# Patient Record
Sex: MALE | Race: WHITE | NOT HISPANIC OR LATINO | Employment: UNEMPLOYED | URBAN - METROPOLITAN AREA
[De-identification: names, ages, dates, MRNs, and addresses within clinical notes are randomized per-mention and may not be internally consistent; named-entity substitution may affect disease eponyms.]

---

## 2018-03-05 ENCOUNTER — HOSPITAL ENCOUNTER (EMERGENCY)
Facility: HOSPITAL | Age: 38
Discharge: HOME | End: 2018-03-06
Attending: EMERGENCY MEDICINE | Admitting: EMERGENCY MEDICINE
Payer: SUBSIDIZED

## 2018-03-05 ENCOUNTER — APPOINTMENT (EMERGENCY)
Dept: RADIOLOGY | Facility: HOSPITAL | Age: 38
End: 2018-03-05
Payer: SUBSIDIZED

## 2018-03-05 VITALS
WEIGHT: 290 LBS | DIASTOLIC BLOOD PRESSURE: 87 MMHG | SYSTOLIC BLOOD PRESSURE: 187 MMHG | TEMPERATURE: 98 F | OXYGEN SATURATION: 97 % | HEART RATE: 91 BPM | RESPIRATION RATE: 24 BRPM

## 2018-03-05 DIAGNOSIS — N20.0 KIDNEY STONE: Primary | ICD-10-CM

## 2018-03-05 LAB
ALBUMIN SERPL BCP-MCNC: 3.9 G/DL (ref 3.5–5)
ALP SERPL-CCNC: 80 U/L (ref 46–116)
ALT SERPL W P-5'-P-CCNC: 44 U/L (ref 12–78)
ANION GAP SERPL CALCULATED.3IONS-SCNC: 10 MMOL/L (ref 4–13)
AST SERPL W P-5'-P-CCNC: 21 U/L (ref 5–45)
BASOPHILS # BLD AUTO: 0 THOUSANDS/ΜL (ref 0–0.1)
BASOPHILS NFR BLD AUTO: 0 % (ref 0–1)
BILIRUB SERPL-MCNC: 0.2 MG/DL (ref 0.2–1)
BUN SERPL-MCNC: 17 MG/DL (ref 5–25)
CALCIUM SERPL-MCNC: 9.4 MG/DL (ref 8.3–10.1)
CHLORIDE SERPL-SCNC: 104 MMOL/L (ref 100–108)
CO2 SERPL-SCNC: 30 MMOL/L (ref 21–32)
CREAT SERPL-MCNC: 0.96 MG/DL (ref 0.6–1.3)
EOSINOPHIL # BLD AUTO: 0.1 THOUSAND/ΜL (ref 0–0.61)
EOSINOPHIL NFR BLD AUTO: 1 % (ref 0–6)
ERYTHROCYTE [DISTWIDTH] IN BLOOD BY AUTOMATED COUNT: 14.6 % (ref 11.6–15.1)
GFR SERPL CREATININE-BSD FRML MDRD: 100 ML/MIN/1.73SQ M
GLUCOSE SERPL-MCNC: 145 MG/DL (ref 65–140)
HCT VFR BLD AUTO: 46.8 % (ref 42–52)
HGB BLD-MCNC: 15.7 G/DL (ref 14–18)
LIPASE SERPL-CCNC: 89 U/L (ref 73–393)
LYMPHOCYTES # BLD AUTO: 1.7 THOUSANDS/ΜL (ref 0.6–4.47)
LYMPHOCYTES NFR BLD AUTO: 14 % (ref 14–44)
MCH RBC QN AUTO: 29.4 PG (ref 27–31)
MCHC RBC AUTO-ENTMCNC: 33.5 G/DL (ref 31.4–37.4)
MCV RBC AUTO: 88 FL (ref 82–98)
MONOCYTES # BLD AUTO: 0.2 THOUSAND/ΜL (ref 0.17–1.22)
MONOCYTES NFR BLD AUTO: 2 % (ref 4–12)
NEUTROPHILS # BLD AUTO: 10 THOUSANDS/ΜL (ref 1.85–7.62)
NEUTS SEG NFR BLD AUTO: 84 % (ref 43–75)
NRBC BLD AUTO-RTO: 0 /100 WBCS
PLATELET # BLD AUTO: 282 THOUSANDS/UL (ref 130–400)
PLATELET BLD QL SMEAR: ADEQUATE
PMV BLD AUTO: 7.7 FL (ref 8.9–12.7)
POTASSIUM SERPL-SCNC: 3.8 MMOL/L (ref 3.5–5.3)
PROT SERPL-MCNC: 7.9 G/DL (ref 6.4–8.2)
RBC # BLD AUTO: 5.33 MILLION/UL (ref 4.7–6.1)
SODIUM SERPL-SCNC: 144 MMOL/L (ref 136–145)
WBC # BLD AUTO: 12 THOUSAND/UL (ref 4.8–10.8)

## 2018-03-05 PROCEDURE — 85025 COMPLETE CBC W/AUTO DIFF WBC: CPT | Performed by: EMERGENCY MEDICINE

## 2018-03-05 PROCEDURE — 96361 HYDRATE IV INFUSION ADD-ON: CPT

## 2018-03-05 PROCEDURE — 36415 COLL VENOUS BLD VENIPUNCTURE: CPT | Performed by: EMERGENCY MEDICINE

## 2018-03-05 PROCEDURE — 96375 TX/PRO/DX INJ NEW DRUG ADDON: CPT

## 2018-03-05 PROCEDURE — 80053 COMPREHEN METABOLIC PANEL: CPT | Performed by: EMERGENCY MEDICINE

## 2018-03-05 PROCEDURE — 74177 CT ABD & PELVIS W/CONTRAST: CPT

## 2018-03-05 PROCEDURE — 83690 ASSAY OF LIPASE: CPT | Performed by: EMERGENCY MEDICINE

## 2018-03-05 PROCEDURE — 96374 THER/PROPH/DIAG INJ IV PUSH: CPT

## 2018-03-05 RX ORDER — KETOROLAC TROMETHAMINE 30 MG/ML
15 INJECTION, SOLUTION INTRAMUSCULAR; INTRAVENOUS ONCE
Status: COMPLETED | OUTPATIENT
Start: 2018-03-05 | End: 2018-03-05

## 2018-03-05 RX ORDER — ONDANSETRON 2 MG/ML
4 INJECTION INTRAMUSCULAR; INTRAVENOUS ONCE
Status: COMPLETED | OUTPATIENT
Start: 2018-03-05 | End: 2018-03-05

## 2018-03-05 RX ADMIN — SODIUM CHLORIDE 1000 ML: 0.9 INJECTION, SOLUTION INTRAVENOUS at 22:39

## 2018-03-05 RX ADMIN — IOHEXOL 100 ML: 350 INJECTION, SOLUTION INTRAVENOUS at 23:37

## 2018-03-05 RX ADMIN — ONDANSETRON 4 MG: 2 INJECTION INTRAMUSCULAR; INTRAVENOUS at 22:45

## 2018-03-05 RX ADMIN — KETOROLAC TROMETHAMINE 15 MG: 30 INJECTION, SOLUTION INTRAMUSCULAR at 22:45

## 2018-03-06 LAB
BACTERIA UR QL AUTO: ABNORMAL /HPF
BILIRUB UR QL STRIP: NEGATIVE
CLARITY UR: CLEAR
COLOR UR: YELLOW
GLUCOSE UR STRIP-MCNC: NEGATIVE MG/DL
HGB UR QL STRIP.AUTO: ABNORMAL
KETONES UR STRIP-MCNC: NEGATIVE MG/DL
LEUKOCYTE ESTERASE UR QL STRIP: NEGATIVE
NITRITE UR QL STRIP: NEGATIVE
NON-SQ EPI CELLS URNS QL MICRO: ABNORMAL /HPF
PH UR STRIP.AUTO: 5 [PH] (ref 5–9)
PROT UR STRIP-MCNC: NEGATIVE MG/DL
RBC #/AREA URNS AUTO: ABNORMAL /HPF
SP GR UR STRIP.AUTO: <=1.005 (ref 1–1.03)
UROBILINOGEN UR QL STRIP.AUTO: 0.2 E.U./DL
WBC #/AREA URNS AUTO: ABNORMAL /HPF

## 2018-03-06 PROCEDURE — 99284 EMERGENCY DEPT VISIT MOD MDM: CPT

## 2018-03-06 PROCEDURE — 81001 URINALYSIS AUTO W/SCOPE: CPT | Performed by: EMERGENCY MEDICINE

## 2018-03-06 RX ORDER — KETOROLAC TROMETHAMINE 10 MG/1
10 TABLET, FILM COATED ORAL EVERY 6 HOURS PRN
Qty: 20 TABLET | Refills: 0 | Status: SHIPPED | OUTPATIENT
Start: 2018-03-06 | End: 2018-04-07 | Stop reason: HOSPADM

## 2018-03-06 NOTE — ED PROVIDER NOTES
History  Chief Complaint   Patient presents with    Abdominal Pain     abd pain on L side and vomiting since this afternoon  states he took an antiparasitic pill given to him by his mother  states that is normal thing to do in  [place] he is from     Patient presents for sudden onset of LLQ pain with nausea and vomiting  States he took an antiparasite pill his mom sent him from [facility]'s  Patient has been in the [facility] for 20 years but its a normal thing to do regularly there  History provided by:  Patient   used: No    Abdominal Pain   Associated symptoms: nausea and vomiting    Associated symptoms: no chest pain, no diarrhea and no shortness of breath        None       History reviewed  No pertinent past medical history  History reviewed  No pertinent surgical history  History reviewed  No pertinent family history  I have reviewed and agree with the history as documented  Social History   Substance Use Topics    Smoking status: Current Every Day Smoker     Packs/day: 0 50    Smokeless tobacco: Never Used    Alcohol use No        Review of Systems   Respiratory: Negative for shortness of breath  Cardiovascular: Negative for chest pain  Gastrointestinal: Positive for abdominal pain, nausea and vomiting  Negative for blood in stool and diarrhea  All other systems reviewed and are negative  Physical Exam  ED Triage Vitals [03/05/18 2235]   Temperature Pulse Respirations Blood Pressure SpO2   98 °F (36 7 °C) 91 (!) 24 (!) 187/87 97 %      Temp Source Heart Rate Source Patient Position - Orthostatic VS BP Location FiO2 (%)   Tympanic Monitor Sitting Left arm --      Pain Score       8           Orthostatic Vital Signs  Vitals:    03/05/18 2235   BP: (!) 187/87   Pulse: 91   Patient Position - Orthostatic VS: Sitting       Physical Exam   Constitutional: He is oriented to person, place, and time  No distress     HENT:   Mouth/Throat: Oropharynx is clear and moist    Eyes: Pupils are equal, round, and reactive to light  Neck: Normal range of motion  Cardiovascular: Normal rate, regular rhythm and intact distal pulses  Pulmonary/Chest: Effort normal and breath sounds normal  No respiratory distress  Abdominal: Soft  Bowel sounds are normal  He exhibits no distension  There is tenderness  There is no rebound and no guarding  LLQ tenderness   Musculoskeletal: Normal range of motion  Neurological: He is alert and oriented to person, place, and time  Skin: Capillary refill takes less than 2 seconds  He is not diaphoretic  Nursing note and vitals reviewed        ED Medications  Medications   sodium chloride 0 9 % bolus 1,000 mL (0 mL Intravenous Stopped 3/6/18 0006)   ondansetron (ZOFRAN) injection 4 mg (4 mg Intravenous Given 3/5/18 2245)   ketorolac (TORADOL) injection 15 mg (15 mg Intravenous Given 3/5/18 2245)   iohexol (OMNIPAQUE) 350 MG/ML injection (MULTI-DOSE) 100 mL (100 mL Intravenous Given 3/5/18 2337)       Diagnostic Studies  Results Reviewed     Procedure Component Value Units Date/Time    Urine Microscopic [[id number]]  (Abnormal) Collected:  03/06/18 0007    Lab Status:  Final result Specimen:  Urine from Urine, Clean Catch Updated:  03/06/18 0022     RBC, UA 10-20 (A) /hpf      WBC, UA None Seen /hpf      Epithelial Cells None Seen /hpf      Bacteria, UA Occasional /hpf     UA w Reflex to Microscopic [[id number]]  (Abnormal) Collected:  03/06/18 0007    Lab Status:  Final result Specimen:  Urine from Urine, Clean Catch Updated:  03/06/18 0013     Color, UA Yellow     Clarity, UA Clear     Specific Gravity, UA <=1 005     pH, UA 5 0     Leukocytes, UA Negative     Nitrite, UA Negative     Protein, UA Negative mg/dl      Glucose, UA Negative mg/dl      Ketones, UA Negative mg/dl      Urobilinogen, UA 0 2 E U /dl      Bilirubin, UA Negative     Blood, UA Large (A)    Comprehensive metabolic panel [[id number]]  (Abnormal) Collected:  03/05/18 7557 Lab Status:  Final result Specimen:  Blood from Arm, Right Updated:  03/05/18 2307     Sodium 144 mmol/L      Potassium 3 8 mmol/L      Chloride 104 mmol/L      CO2 30 mmol/L      Anion Gap 10 mmol/L      BUN 17 mg/dL      Creatinine 0 96 mg/dL      Glucose 145 (H) mg/dL      Calcium 9 4 mg/dL      AST 21 U/L      ALT 44 U/L      Alkaline Phosphatase 80 U/L      Total Protein 7 9 g/dL      Albumin 3 9 g/dL      Total Bilirubin 0 20 mg/dL      eGFR 100 ml/min/1 73sq m     Narrative:         National Kidney Disease Education Program recommendations are as follows:  GFR calculation is accurate only with a steady state creatinine  Chronic Kidney disease less than 60 ml/min/1 73 sq  meters  Kidney failure less than 15 ml/min/1 73 sq  meters  Lipase [[id number]]  (Normal) Collected:  03/05/18 2239    Lab Status:  Final result Specimen:  Blood from Arm, Right Updated:  03/05/18 2307     Lipase 89 u/L     CBC and differential [[id number]]  (Abnormal) Collected:  03/05/18 2239    Lab Status:  Final result Specimen:  Blood from Arm, Right Updated:  03/05/18 2306     WBC 12 00 (H) Thousand/uL      RBC 5 33 Million/uL      Hemoglobin 15 7 g/dL      Hematocrit 46 8 %      MCV 88 fL      MCH 29 4 pg      MCHC 33 5 g/dL      RDW 14 6 %      MPV 7 7 (L) fL      Platelets 046 Thousands/uL      nRBC 0 /100 WBCs      Neutrophils Relative 84 (H) %      Lymphocytes Relative 14 %      Monocytes Relative 2 (L) %      Eosinophils Relative 1 %      Basophils Relative 0 %      Neutrophils Absolute 10 00 (H) Thousands/µL      Lymphocytes Absolute 1 70 Thousands/µL      Monocytes Absolute 0 20 Thousand/µL      Eosinophils Absolute 0 10 Thousand/µL      Basophils Absolute 0 00 Thousands/µL                  CT abdomen pelvis with contrast   Final Result by Amparo Hoang MD (03/05 2357)      Mild hydronephrosis and hydroureter  Obstructing 5 mm calculus in the distal left ureter at the level of the acetabular roof              Workstation performed: [id number]                    Procedures  Procedures       Phone Contacts  ED Phone Contact    ED Course  ED Course                                MDM  Number of Diagnoses or Management Options  Kidney stone:   Diagnosis management comments: Pulse ox 97% on RA indicating adequate oxygenation    Patient feeling better on re-exam  Informed of kidney stone and still possibility of him passing on his own  Discussed pain management but he did not want anything stronger than what he got which was Toradol  Advised follow up with Urology  Amount and/or Complexity of Data Reviewed  Clinical lab tests: ordered and reviewed  Tests in the radiology section of CPT®: ordered and reviewed    Patient Progress  Patient progress: stable    CritCare Time    Disposition  Final diagnoses:   Kidney stone     Time reflects when diagnosis was documented in both MDM as applicable and the Disposition within this note     Time User Action Codes Description Comment    3/6/2018 12:21 AM Alexa Gain Add [N20 0] Kidney stone       ED Disposition     ED Disposition Condition Comment    Discharge  [place] discharge to home/self care  Condition at discharge: stable        Follow-up Information     Follow up With Specialties Details Why Dereje Enriquez MD Urology In 3 days  [facility] [phone]          Patient's Medications   Discharge Prescriptions    KETOROLAC (TORADOL) 10 MG TABLET    Take 1 tablet (10 mg total) by mouth every 6 (six) hours as needed for moderate pain for up to 5 days       Start Date: 3/6/2018  End Date: 3/11/2018       Order Dose: 10 mg       Quantity: 20 tablet    Refills: 0     No discharge procedures on file      ED Provider  Electronically Signed by           Bradley Stafford DO  03/06/18 0028

## 2018-03-06 NOTE — DISCHARGE INSTRUCTIONS
How to Strain Your Urine   WHAT YOU NEED TO KNOW:   Urinate into a strainer (funnel with a fine mesh on the bottom) or glass jar to collect kidney stones  DISCHARGE INSTRUCTIONS:   Medicines:   · Pain medicine: You may be given medicine to take away or decrease pain  Do not wait until the pain is severe before you take your medicine  · NSAIDs:  These medicines decrease swelling, pain, and fever  NSAIDs are available without a doctor's order  Ask which medicine is right for you  Ask how much to take and when to take it  Take as directed  NSAIDs can cause stomach bleeding and kidney problems if not taken correctly  · Nausea medicine: This medicine calms your stomach and prevents or controls vomiting  · Take your medicine as directed  Contact your healthcare provider if you think your medicine is not helping or if you have side effects  Tell him of her if you are allergic to any medicine  Keep a list of the medicines, vitamins, and herbs you take  Include the amounts, and when and why you take them  Bring the list or the pill bottles to follow-up visits  Carry your medicine list with you in case of an emergency  Drink liquids as directed:  Drink about 3 liters of liquids each day, or as directed  That equals about 12 glasses of water or fruit juice  Half of your total daily liquids should be water  Limit coffee, tea, and soda to 2 cups daily  Your urine will be pale and clear if you are drinking enough liquid  Self-care:   · Activity:  Exercise, such as walking, may help decrease your pain  · Avoid heat:  Heat may cause you to sweat, urinate less, and become dehydrated  Follow up with your healthcare provider or urologist as directed:  Write down your questions so you remember to ask them during your visits  Contact your healthcare provider or urologist if:   · You have a fever and chills  · Your urine looks cloudy or has a bad smell  · You have burning pain when you urinate       · You have trouble urinating  · You are vomiting and it does not get better, even after you take medicine  · You have questions or concerns about your condition or care  Return to the emergency department if:   · You are not able to urinate  · You have severe pain in your lower abdomen or side  · Your heart flutters or beats faster than usual   © 2017 [address] [employer] Information is for End User's use only and may not be sold, redistributed or otherwise used for commercial purposes  All illustrations and images included in CareNotes® are the copyrighted property of [employer]  or [employer]  The above information is an  only  It is not intended as medical advice for individual conditions or treatments  Talk to your doctor, nurse or pharmacist before following any medical regimen to see if it is safe and effective for you  Kidney Stones   WHAT YOU NEED TO KNOW:   Kidney stones form in the urinary system when the water and waste in your urine are out of balance  When this happens, certain types of waste crystals separate from the urine  The crystals build up and form kidney stones  You may have 1 or more kidney stones  DISCHARGE INSTRUCTIONS:   Seek care immediately:   · You have vomiting that is not relieved by medicine  Contact your healthcare provider if:   · You have a fever  · You have trouble passing urine  · You see blood in your urine  · You have severe pain  · You have any questions or concerns about your condition or care  Medicines:   · NSAIDs , such as ibuprofen, help decrease swelling, pain, and fever  This medicine is available with or without a doctor's order  NSAIDs can cause stomach bleeding or kidney problems in certain people  If you take blood thinner medicine, always ask your healthcare provider if NSAIDs are safe for you  Always read the medicine label and follow directions  · Prescription medicine  may be given   Ask how to take this medicine safely  · Medicines  to balance your electrolytes may be needed  · Take your medicine as directed  Contact your healthcare provider if you think your medicine is not helping or if you have side effects  Tell him or her if you are allergic to any medicine  Keep a list of the medicines, vitamins, and herbs you take  Include the amounts, and when and why you take them  Bring the list or the pill bottles to follow-up visits  Carry your medicine list with you in case of an emergency  Follow up with your healthcare provider as directed: You may need to return for more tests  Write down your questions so you remember to ask them during your visits  Self-care:   · Drink plenty of liquids  Your healthcare provider may tell you to drink at least 8 to 12 (eight-ounce) cups of liquids each day  This helps flush out the kidney stones when you urinate  Water is the best liquid to drink  · Strain your urine every time you go to the bathroom  Urinate through a strainer or a piece of thin cloth to catch the stones  Take the stones to your healthcare provider so they can be sent to the lab for tests  This will help your healthcare providers plan the best treatment for you  · Eat a variety of healthy foods  Healthy foods include fruits, vegetables, whole-grain breads, low-fat dairy products, beans, and fish  You may need to limit how much sodium (salt) or protein you eat  Ask for information about the best foods for you  · Stay active  Your stones may pass more easily by if you stay active  Ask about the best activities for you  After you pass your kidney stones:  Once you have passed your kidney stones, your healthcare provider may  order a 24-hour urine test  Results from a 24-hour urine test will help your healthcare provider plan ways to prevent more stones from forming  If you are told to do a 24-hour test, your healthcare provider will give you more instructions     © 2017 [facility] Information is for End User's use only and may not be sold, redistributed or otherwise used for commercial purposes  All illustrations and images included in CareNotes® are the copyrighted property of A [employer] M [employer] Inc  or [employer]  The above information is an  only  It is not intended as medical advice for individual conditions or treatments  Talk to your doctor, nurse or pharmacist before following any medical regimen to see if it is safe and effective for you

## 2018-04-06 ENCOUNTER — HOSPITAL ENCOUNTER (INPATIENT)
Facility: HOSPITAL | Age: 38
LOS: 1 days | Discharge: HOME | DRG: 311 | End: 2018-04-07
Attending: EMERGENCY MEDICINE | Admitting: INTERNAL MEDICINE
Payer: SUBSIDIZED

## 2018-04-06 ENCOUNTER — APPOINTMENT (EMERGENCY)
Dept: RADIOLOGY | Facility: HOSPITAL | Age: 38
DRG: 311 | End: 2018-04-06
Payer: SUBSIDIZED

## 2018-04-06 DIAGNOSIS — N20.1 LEFT URETERAL STONE: ICD-10-CM

## 2018-04-06 DIAGNOSIS — N20.0 KIDNEY STONE: Primary | ICD-10-CM

## 2018-04-06 DIAGNOSIS — N20.1 LEFT URETERAL CALCULUS: ICD-10-CM

## 2018-04-06 LAB
ANION GAP SERPL CALCULATED.3IONS-SCNC: 10 MMOL/L (ref 4–13)
BASOPHILS # BLD AUTO: 0 THOUSANDS/ΜL (ref 0–0.1)
BASOPHILS NFR BLD AUTO: 0 % (ref 0–1)
BUN SERPL-MCNC: 18 MG/DL (ref 5–25)
CALCIUM SERPL-MCNC: 9.1 MG/DL (ref 8.3–10.1)
CHLORIDE SERPL-SCNC: 104 MMOL/L (ref 100–108)
CO2 SERPL-SCNC: 26 MMOL/L (ref 21–32)
CREAT SERPL-MCNC: 1.31 MG/DL (ref 0.6–1.3)
EOSINOPHIL # BLD AUTO: 0.2 THOUSAND/ΜL (ref 0–0.61)
EOSINOPHIL NFR BLD AUTO: 2 % (ref 0–6)
ERYTHROCYTE [DISTWIDTH] IN BLOOD BY AUTOMATED COUNT: 13.6 % (ref 11.6–15.1)
GFR SERPL CREATININE-BSD FRML MDRD: 69 ML/MIN/1.73SQ M
GLUCOSE SERPL-MCNC: 113 MG/DL (ref 65–140)
HCT VFR BLD AUTO: 42.9 % (ref 42–52)
HGB BLD-MCNC: 14.4 G/DL (ref 14–18)
LYMPHOCYTES # BLD AUTO: 1.9 THOUSANDS/ΜL (ref 0.6–4.47)
LYMPHOCYTES NFR BLD AUTO: 21 % (ref 14–44)
MCH RBC QN AUTO: 29.5 PG (ref 27–31)
MCHC RBC AUTO-ENTMCNC: 33.5 G/DL (ref 31.4–37.4)
MCV RBC AUTO: 88 FL (ref 82–98)
MONOCYTES # BLD AUTO: 0.6 THOUSAND/ΜL (ref 0.17–1.22)
MONOCYTES NFR BLD AUTO: 6 % (ref 4–12)
NEUTROPHILS # BLD AUTO: 6.6 THOUSANDS/ΜL (ref 1.85–7.62)
NEUTS SEG NFR BLD AUTO: 71 % (ref 43–75)
NRBC BLD AUTO-RTO: 0 /100 WBCS
PLATELET # BLD AUTO: 264 THOUSANDS/UL (ref 130–400)
PMV BLD AUTO: 7.4 FL (ref 8.9–12.7)
POTASSIUM SERPL-SCNC: 3.8 MMOL/L (ref 3.5–5.3)
RBC # BLD AUTO: 4.88 MILLION/UL (ref 4.7–6.1)
SODIUM SERPL-SCNC: 140 MMOL/L (ref 136–145)
WBC # BLD AUTO: 9.3 THOUSAND/UL (ref 4.8–10.8)

## 2018-04-06 PROCEDURE — 85025 COMPLETE CBC W/AUTO DIFF WBC: CPT | Performed by: EMERGENCY MEDICINE

## 2018-04-06 PROCEDURE — 80048 BASIC METABOLIC PNL TOTAL CA: CPT | Performed by: EMERGENCY MEDICINE

## 2018-04-06 PROCEDURE — 74176 CT ABD & PELVIS W/O CONTRAST: CPT

## 2018-04-06 PROCEDURE — 96374 THER/PROPH/DIAG INJ IV PUSH: CPT

## 2018-04-06 PROCEDURE — 96375 TX/PRO/DX INJ NEW DRUG ADDON: CPT

## 2018-04-06 PROCEDURE — 36415 COLL VENOUS BLD VENIPUNCTURE: CPT | Performed by: EMERGENCY MEDICINE

## 2018-04-06 PROCEDURE — 96361 HYDRATE IV INFUSION ADD-ON: CPT

## 2018-04-06 RX ORDER — SODIUM CHLORIDE 9 MG/ML
125 INJECTION, SOLUTION INTRAVENOUS CONTINUOUS
Status: DISCONTINUED | OUTPATIENT
Start: 2018-04-06 | End: 2018-04-07

## 2018-04-06 RX ORDER — TAMSULOSIN HYDROCHLORIDE 0.4 MG/1
0.4 CAPSULE ORAL ONCE
Status: COMPLETED | OUTPATIENT
Start: 2018-04-06 | End: 2018-04-06

## 2018-04-06 RX ORDER — KETOROLAC TROMETHAMINE 30 MG/ML
15 INJECTION, SOLUTION INTRAMUSCULAR; INTRAVENOUS ONCE
Status: COMPLETED | OUTPATIENT
Start: 2018-04-06 | End: 2018-04-06

## 2018-04-06 RX ORDER — MORPHINE SULFATE 4 MG/ML
4 INJECTION, SOLUTION INTRAMUSCULAR; INTRAVENOUS ONCE
Status: COMPLETED | OUTPATIENT
Start: 2018-04-06 | End: 2018-04-06

## 2018-04-06 RX ORDER — ONDANSETRON 2 MG/ML
4 INJECTION INTRAMUSCULAR; INTRAVENOUS ONCE
Status: COMPLETED | OUTPATIENT
Start: 2018-04-06 | End: 2018-04-06

## 2018-04-06 RX ADMIN — MORPHINE SULFATE 4 MG: 4 INJECTION INTRAVENOUS at 22:20

## 2018-04-06 RX ADMIN — ONDANSETRON 4 MG: 2 INJECTION INTRAMUSCULAR; INTRAVENOUS at 22:20

## 2018-04-06 RX ADMIN — TAMSULOSIN HYDROCHLORIDE 0.4 MG: 0.4 CAPSULE ORAL at 23:49

## 2018-04-06 RX ADMIN — KETOROLAC TROMETHAMINE 15 MG: 30 INJECTION, SOLUTION INTRAMUSCULAR at 22:22

## 2018-04-06 RX ADMIN — SODIUM CHLORIDE 1000 ML: 0.9 INJECTION, SOLUTION INTRAVENOUS at 22:18

## 2018-04-06 RX ADMIN — SODIUM CHLORIDE 125 ML/HR: 0.9 INJECTION, SOLUTION INTRAVENOUS at 23:50

## 2018-04-07 ENCOUNTER — APPOINTMENT (INPATIENT)
Dept: RADIOLOGY | Facility: HOSPITAL | Age: 38
DRG: 311 | End: 2018-04-07
Payer: SUBSIDIZED

## 2018-04-07 ENCOUNTER — ANESTHESIA (INPATIENT)
Dept: PERIOP | Facility: HOSPITAL | Age: 38
DRG: 311 | End: 2018-04-07
Payer: SUBSIDIZED

## 2018-04-07 ENCOUNTER — ANESTHESIA EVENT (INPATIENT)
Dept: PERIOP | Facility: HOSPITAL | Age: 38
DRG: 311 | End: 2018-04-07
Payer: SUBSIDIZED

## 2018-04-07 VITALS
BODY MASS INDEX: 39.2 KG/M2 | DIASTOLIC BLOOD PRESSURE: 67 MMHG | HEIGHT: 71 IN | WEIGHT: 280 LBS | SYSTOLIC BLOOD PRESSURE: 111 MMHG | TEMPERATURE: 97 F | OXYGEN SATURATION: 98 % | HEART RATE: 69 BPM | RESPIRATION RATE: 20 BRPM

## 2018-04-07 PROBLEM — K59.00 CONSTIPATION: Status: ACTIVE | Noted: 2018-04-07

## 2018-04-07 PROBLEM — R10.9 FLANK PAIN: Status: ACTIVE | Noted: 2018-04-07

## 2018-04-07 PROBLEM — N20.1 LEFT URETERAL STONE: Status: ACTIVE | Noted: 2018-04-06

## 2018-04-07 PROBLEM — N20.0 KIDNEY STONE: Status: ACTIVE | Noted: 2018-04-07

## 2018-04-07 LAB
ANION GAP SERPL CALCULATED.3IONS-SCNC: 7 MMOL/L (ref 4–13)
BILIRUB UR QL STRIP: NEGATIVE
BUN SERPL-MCNC: 15 MG/DL (ref 5–25)
CALCIUM SERPL-MCNC: 8.5 MG/DL (ref 8.3–10.1)
CHLORIDE SERPL-SCNC: 106 MMOL/L (ref 100–108)
CLARITY UR: CLEAR
CO2 SERPL-SCNC: 25 MMOL/L (ref 21–32)
COLOR UR: YELLOW
CREAT SERPL-MCNC: 1.11 MG/DL (ref 0.6–1.3)
ERYTHROCYTE [DISTWIDTH] IN BLOOD BY AUTOMATED COUNT: 14.1 % (ref 11.6–15.1)
GFR SERPL CREATININE-BSD FRML MDRD: 84 ML/MIN/1.73SQ M
GLUCOSE SERPL-MCNC: 96 MG/DL (ref 65–140)
GLUCOSE UR STRIP-MCNC: NEGATIVE MG/DL
HCT VFR BLD AUTO: 40.9 % (ref 42–52)
HGB BLD-MCNC: 13.6 G/DL (ref 14–18)
HGB UR QL STRIP.AUTO: NEGATIVE
KETONES UR STRIP-MCNC: NEGATIVE MG/DL
LEUKOCYTE ESTERASE UR QL STRIP: NEGATIVE
MAGNESIUM SERPL-MCNC: 2.1 MG/DL (ref 1.6–2.6)
MCH RBC QN AUTO: 29.5 PG (ref 27–31)
MCHC RBC AUTO-ENTMCNC: 33.3 G/DL (ref 31.4–37.4)
MCV RBC AUTO: 89 FL (ref 82–98)
NITRITE UR QL STRIP: NEGATIVE
PH UR STRIP.AUTO: 6 [PH] (ref 5–9)
PHOSPHATE SERPL-MCNC: 3.6 MG/DL (ref 2.7–4.5)
PLATELET # BLD AUTO: 242 THOUSANDS/UL (ref 130–400)
PMV BLD AUTO: 7.7 FL (ref 8.9–12.7)
POTASSIUM SERPL-SCNC: 3.9 MMOL/L (ref 3.5–5.3)
PROT UR STRIP-MCNC: NEGATIVE MG/DL
RBC # BLD AUTO: 4.61 MILLION/UL (ref 4.7–6.1)
SODIUM SERPL-SCNC: 138 MMOL/L (ref 136–145)
SP GR UR STRIP.AUTO: 1.02 (ref 1–1.03)
TSH SERPL DL<=0.05 MIU/L-ACNC: 1.38 UIU/ML (ref 0.36–3.74)
UROBILINOGEN UR QL STRIP.AUTO: 0.2 E.U./DL
WBC # BLD AUTO: 6.5 THOUSAND/UL (ref 4.8–10.8)

## 2018-04-07 PROCEDURE — 84100 ASSAY OF PHOSPHORUS: CPT | Performed by: INTERNAL MEDICINE

## 2018-04-07 PROCEDURE — 99222 1ST HOSP IP/OBS MODERATE 55: CPT | Performed by: INTERNAL MEDICINE

## 2018-04-07 PROCEDURE — BT1FYZZ FLUOROSCOPY OF LEFT KIDNEY, URETER AND BLADDER USING OTHER CONTRAST: ICD-10-PCS | Performed by: UROLOGY

## 2018-04-07 PROCEDURE — C1769 GUIDE WIRE: HCPCS | Performed by: UROLOGY

## 2018-04-07 PROCEDURE — 99238 HOSP IP/OBS DSCHRG MGMT 30/<: CPT | Performed by: INTERNAL MEDICINE

## 2018-04-07 PROCEDURE — 0TC78ZZ EXTIRPATION OF MATTER FROM LEFT URETER, VIA NATURAL OR ARTIFICIAL OPENING ENDOSCOPIC: ICD-10-PCS | Performed by: UROLOGY

## 2018-04-07 PROCEDURE — 84443 ASSAY THYROID STIM HORMONE: CPT | Performed by: INTERNAL MEDICINE

## 2018-04-07 PROCEDURE — 85027 COMPLETE CBC AUTOMATED: CPT | Performed by: INTERNAL MEDICINE

## 2018-04-07 PROCEDURE — 80048 BASIC METABOLIC PNL TOTAL CA: CPT | Performed by: INTERNAL MEDICINE

## 2018-04-07 PROCEDURE — 74450 X-RAY URETHRA/BLADDER: CPT

## 2018-04-07 PROCEDURE — 88300 SURGICAL PATH GROSS: CPT | Performed by: PATHOLOGY

## 2018-04-07 PROCEDURE — 99285 EMERGENCY DEPT VISIT HI MDM: CPT

## 2018-04-07 PROCEDURE — 87081 CULTURE SCREEN ONLY: CPT | Performed by: INTERNAL MEDICINE

## 2018-04-07 PROCEDURE — 0T778DZ DILATION OF LEFT URETER WITH INTRALUMINAL DEVICE, VIA NATURAL OR ARTIFICIAL OPENING ENDOSCOPIC: ICD-10-PCS | Performed by: UROLOGY

## 2018-04-07 PROCEDURE — 83735 ASSAY OF MAGNESIUM: CPT | Performed by: INTERNAL MEDICINE

## 2018-04-07 PROCEDURE — 81003 URINALYSIS AUTO W/O SCOPE: CPT | Performed by: EMERGENCY MEDICINE

## 2018-04-07 PROCEDURE — C2617 STENT, NON-COR, TEM W/O DEL: HCPCS | Performed by: UROLOGY

## 2018-04-07 PROCEDURE — 82360 CALCULUS ASSAY QUANT: CPT | Performed by: UROLOGY

## 2018-04-07 DEVICE — URETERAL STENT 6 FR X 26 CM INLAY: Type: IMPLANTABLE DEVICE | Site: URETER | Status: FUNCTIONAL

## 2018-04-07 RX ORDER — OXYCODONE HYDROCHLORIDE AND ACETAMINOPHEN 5; 325 MG/1; MG/1
1 TABLET ORAL EVERY 8 HOURS PRN
Qty: 10 TABLET | Refills: 0 | Status: SHIPPED | OUTPATIENT
Start: 2018-04-07 | End: 2018-04-10

## 2018-04-07 RX ORDER — SODIUM CHLORIDE 9 MG/ML
150 INJECTION, SOLUTION INTRAVENOUS CONTINUOUS
Status: DISCONTINUED | OUTPATIENT
Start: 2018-04-07 | End: 2018-04-07 | Stop reason: HOSPADM

## 2018-04-07 RX ORDER — SENNOSIDES 8.6 MG
2 TABLET ORAL
Status: DISCONTINUED | OUTPATIENT
Start: 2018-04-07 | End: 2018-04-07 | Stop reason: HOSPADM

## 2018-04-07 RX ORDER — FENTANYL CITRATE/PF 50 MCG/ML
25 SYRINGE (ML) INJECTION
Status: DISCONTINUED | OUTPATIENT
Start: 2018-04-07 | End: 2018-04-07 | Stop reason: HOSPADM

## 2018-04-07 RX ORDER — KETOROLAC TROMETHAMINE 30 MG/ML
30 INJECTION, SOLUTION INTRAMUSCULAR; INTRAVENOUS EVERY 6 HOURS PRN
Status: DISCONTINUED | OUTPATIENT
Start: 2018-04-07 | End: 2018-04-07 | Stop reason: HOSPADM

## 2018-04-07 RX ORDER — FENTANYL CITRATE 50 UG/ML
INJECTION, SOLUTION INTRAMUSCULAR; INTRAVENOUS AS NEEDED
Status: DISCONTINUED | OUTPATIENT
Start: 2018-04-07 | End: 2018-04-07 | Stop reason: SURG

## 2018-04-07 RX ORDER — ONDANSETRON 2 MG/ML
4 INJECTION INTRAMUSCULAR; INTRAVENOUS EVERY 6 HOURS PRN
Status: DISCONTINUED | OUTPATIENT
Start: 2018-04-07 | End: 2018-04-07 | Stop reason: HOSPADM

## 2018-04-07 RX ORDER — ONDANSETRON 2 MG/ML
4 INJECTION INTRAMUSCULAR; INTRAVENOUS ONCE AS NEEDED
Status: DISCONTINUED | OUTPATIENT
Start: 2018-04-07 | End: 2018-04-07 | Stop reason: HOSPADM

## 2018-04-07 RX ORDER — DOCUSATE SODIUM 100 MG/1
100 CAPSULE, LIQUID FILLED ORAL 2 TIMES DAILY
Status: DISCONTINUED | OUTPATIENT
Start: 2018-04-07 | End: 2018-04-07 | Stop reason: HOSPADM

## 2018-04-07 RX ORDER — ONDANSETRON 2 MG/ML
INJECTION INTRAMUSCULAR; INTRAVENOUS AS NEEDED
Status: DISCONTINUED | OUTPATIENT
Start: 2018-04-07 | End: 2018-04-07 | Stop reason: SURG

## 2018-04-07 RX ORDER — MIDAZOLAM HYDROCHLORIDE 1 MG/ML
INJECTION INTRAMUSCULAR; INTRAVENOUS AS NEEDED
Status: DISCONTINUED | OUTPATIENT
Start: 2018-04-07 | End: 2018-04-07 | Stop reason: SURG

## 2018-04-07 RX ORDER — PROPOFOL 10 MG/ML
INJECTION, EMULSION INTRAVENOUS AS NEEDED
Status: DISCONTINUED | OUTPATIENT
Start: 2018-04-07 | End: 2018-04-07 | Stop reason: SURG

## 2018-04-07 RX ORDER — TAMSULOSIN HYDROCHLORIDE 0.4 MG/1
0.4 CAPSULE ORAL
Status: DISCONTINUED | OUTPATIENT
Start: 2018-04-07 | End: 2018-04-07 | Stop reason: HOSPADM

## 2018-04-07 RX ORDER — MORPHINE SULFATE 2 MG/ML
2 INJECTION, SOLUTION INTRAMUSCULAR; INTRAVENOUS EVERY 4 HOURS PRN
Status: DISCONTINUED | OUTPATIENT
Start: 2018-04-07 | End: 2018-04-07 | Stop reason: HOSPADM

## 2018-04-07 RX ORDER — MAGNESIUM HYDROXIDE 1200 MG/15ML
LIQUID ORAL AS NEEDED
Status: DISCONTINUED | OUTPATIENT
Start: 2018-04-07 | End: 2018-04-07 | Stop reason: HOSPADM

## 2018-04-07 RX ORDER — CEFADROXIL 500 MG/1
500 CAPSULE ORAL EVERY 12 HOURS SCHEDULED
Qty: 6 CAPSULE | Refills: 0 | Status: SHIPPED | OUTPATIENT
Start: 2018-04-07 | End: 2018-04-10

## 2018-04-07 RX ORDER — POLYETHYLENE GLYCOL 3350 17 G/17G
17 POWDER, FOR SOLUTION ORAL DAILY PRN
Status: DISCONTINUED | OUTPATIENT
Start: 2018-04-07 | End: 2018-04-07 | Stop reason: HOSPADM

## 2018-04-07 RX ADMIN — MIDAZOLAM HYDROCHLORIDE 2 MG: 1 INJECTION, SOLUTION INTRAMUSCULAR; INTRAVENOUS at 10:46

## 2018-04-07 RX ADMIN — DOCUSATE SODIUM 100 MG: 100 CAPSULE, LIQUID FILLED ORAL at 08:05

## 2018-04-07 RX ADMIN — ONDANSETRON 4 MG: 2 INJECTION INTRAMUSCULAR; INTRAVENOUS at 10:53

## 2018-04-07 RX ADMIN — PROPOFOL 20 MG: 10 INJECTION, EMULSION INTRAVENOUS at 10:50

## 2018-04-07 RX ADMIN — SODIUM CHLORIDE: 0.9 INJECTION, SOLUTION INTRAVENOUS at 10:43

## 2018-04-07 RX ADMIN — KETOROLAC TROMETHAMINE 30 MG: 30 INJECTION, SOLUTION INTRAMUSCULAR at 06:00

## 2018-04-07 RX ADMIN — FENTANYL CITRATE 25 MCG: 50 INJECTION, SOLUTION INTRAMUSCULAR; INTRAVENOUS at 10:48

## 2018-04-07 RX ADMIN — SENNOSIDES 17.2 MG: 8.6 TABLET, FILM COATED ORAL at 01:05

## 2018-04-07 RX ADMIN — PROPOFOL 50 MG: 10 INJECTION, EMULSION INTRAVENOUS at 10:46

## 2018-04-07 RX ADMIN — SODIUM CHLORIDE 150 ML/HR: 0.9 INJECTION, SOLUTION INTRAVENOUS at 01:05

## 2018-04-07 RX ADMIN — CEFAZOLIN SODIUM 1000 MG: 1 SOLUTION INTRAVENOUS at 10:46

## 2018-04-07 RX ADMIN — PROPOFOL 20 MG: 10 INJECTION, EMULSION INTRAVENOUS at 10:48

## 2018-04-07 RX ADMIN — PROPOFOL 20 MG: 10 INJECTION, EMULSION INTRAVENOUS at 10:52

## 2018-04-07 RX ADMIN — FENTANYL CITRATE 50 MCG: 50 INJECTION, SOLUTION INTRAMUSCULAR; INTRAVENOUS at 10:46

## 2018-04-07 RX ADMIN — FENTANYL CITRATE 25 MCG: 50 INJECTION, SOLUTION INTRAMUSCULAR; INTRAVENOUS at 10:49

## 2018-04-07 NOTE — ANESTHESIA PREPROCEDURE EVALUATION
Review of Systems/Medical History  Patient summary reviewed  Chart reviewed  No history of anesthetic complications     Cardiovascular   Pulmonary  Smoker cigarette smoker  , Tobacco cessation counseling given ,        GI/Hepatic       Kidney stones,        Endo/Other    Obesity  morbid obesity   GYN       Hematology   Musculoskeletal       Neurology   Psychology           Physical Exam    Airway    Mallampati score: II  TM Distance: >3 FB  Neck ROM: full     Dental   No notable dental hx     Cardiovascular  Cardiovascular exam normal    Pulmonary  Pulmonary exam normal     Other Findings        Anesthesia Plan  ASA Score- 3     Anesthesia Type- general with ASA Monitors  Additional Monitors:   Airway Plan: LMA  Plan Factors-    Induction- intravenous  Postoperative Plan- Plan for postoperative opioid use  Informed Consent- Anesthetic plan and risks discussed with patient

## 2018-04-07 NOTE — ED PROVIDER NOTES
History  Chief Complaint   Patient presents with    Flank Pain     patient c/o left side flank pain starting sunday, ran out of pain medications today, unable to follow up with doctor due to no insurance      Patient presents tonight for evaluation of left flank pain  Patient seen previously for kidney stone  States could not afford to follow up because he doesn't have insurance  Reports pain was ok for a while but worse tonight and having hematuria  History provided by:  Patient   used: No    Flank Pain   Associated symptoms: hematuria        Prior to Admission Medications   Prescriptions Last Dose Informant Patient Reported? Taking?   ketorolac (TORADOL) 10 mg tablet   No No   Sig: Take 1 tablet (10 mg total) by mouth every 6 (six) hours as needed for moderate pain for up to 5 days      Facility-Administered Medications: None       Past Medical History:   Diagnosis Date    Cornea disorder     Kidney stone        History reviewed  No pertinent surgical history  History reviewed  No pertinent family history  I have reviewed and agree with the history as documented  Social History   Substance Use Topics    Smoking status: Current Every Day Smoker     Packs/day: 0 50    Smokeless tobacco: Never Used    Alcohol use No        Review of Systems   Genitourinary: Positive for flank pain and hematuria  All other systems reviewed and are negative  Physical Exam  ED Triage Vitals [04/06/18 2153]   Temperature Pulse Respirations Blood Pressure SpO2   98 5 °F (36 9 °C) 89 18 (!) 171/82 98 %      Temp Source Heart Rate Source Patient Position - Orthostatic VS BP Location FiO2 (%)   Oral Monitor -- Right arm --      Pain Score       8           Orthostatic Vital Signs  Vitals:    04/06/18 2153   BP: (!) 171/82   Pulse: 89       Physical Exam   Constitutional: He is oriented to person, place, and time  No distress     HENT:   Mouth/Throat: Oropharynx is clear and moist    Eyes: Pupils are equal, round, and reactive to light  Neck: Normal range of motion  Cardiovascular: Normal rate, regular rhythm and intact distal pulses  Pulmonary/Chest: Effort normal and breath sounds normal  No respiratory distress  Abdominal: Soft  There is no tenderness  Musculoskeletal: Normal range of motion  Neurological: He is alert and oriented to person, place, and time  Skin: Capillary refill takes less than 2 seconds  He is not diaphoretic  Nursing note and vitals reviewed  ED Medications  Medications   sodium chloride 0 9 % bolus 1,000 mL (1,000 mL Intravenous New Bag 4/6/18 2218)   ketorolac (TORADOL) injection 15 mg (15 mg Intravenous Given 4/6/18 2222)   ondansetron (ZOFRAN) injection 4 mg (4 mg Intravenous Given 4/6/18 2220)   morphine (PF) 4 mg/mL injection 4 mg (4 mg Intravenous Given 4/6/18 2220)       Diagnostic Studies  Results Reviewed     Procedure Component Value Units Date/Time    Basic metabolic panel [[id number]]  (Abnormal) Collected:  04/06/18 2214    Lab Status:  Final result Specimen:  Blood from Arm, Left Updated:  04/06/18 2227     Sodium 140 mmol/L      Potassium 3 8 mmol/L      Chloride 104 mmol/L      CO2 26 mmol/L      Anion Gap 10 mmol/L      BUN 18 mg/dL      Creatinine 1 31 (H) mg/dL      Glucose 113 mg/dL      Calcium 9 1 mg/dL      eGFR 69 ml/min/1 73sq m     Narrative:         National Kidney Disease Education Program recommendations are as follows:  GFR calculation is accurate only with a steady state creatinine  Chronic Kidney disease less than 60 ml/min/1 73 sq  meters  Kidney failure less than 15 ml/min/1 73 sq  meters      CBC and differential [[id number]]  (Abnormal) Collected:  04/06/18 2214    Lab Status:  Final result Specimen:  Blood from Arm, Left Updated:  04/06/18 2218     WBC 9 30 Thousand/uL      RBC 4 88 Million/uL      Hemoglobin 14 4 g/dL      Hematocrit 42 9 %      MCV 88 fL      MCH 29 5 pg      MCHC 33 5 g/dL      RDW 13 6 %      MPV 7 4 (L) fL      Platelets 692 Thousands/uL      nRBC 0 /100 WBCs      Neutrophils Relative 71 %      Lymphocytes Relative 21 %      Monocytes Relative 6 %      Eosinophils Relative 2 %      Basophils Relative 0 %      Neutrophils Absolute 6 60 Thousands/µL      Lymphocytes Absolute 1 90 Thousands/µL      Monocytes Absolute 0 60 Thousand/µL      Eosinophils Absolute 0 20 Thousand/µL      Basophils Absolute 0 00 Thousands/µL     UA w Reflex to Microscopic [[id number]]     Lab Status:  No result Specimen:  Urine                  CT renal stone study abdomen pelvis without contrast   Final Result by Michael Nielsen MD (04/06 2252)         1  Mild left hydronephrosis and hydroureter  Obstructing 5 5 mm calculus in the distal left ureter at the ureterovesical junction  2   Nonobstructing 2 mm right renal calculus  Workstation performed: [id number]                    Procedures  Procedures       Phone Contacts  ED Phone Contact    ED Course  ED Course as of Apr 06 2343 Fri Apr 06, 2018 2259 Call placed to Dr Zacarias Gaucher, voicemail left  18 Second call placed and got voicemail second time  2343 No call back from Dr Zacarias Gaucher will admit to hospitalist and can consult in the am                                 MDM  Number of Diagnoses or Management Options  Diagnosis management comments: Pulse ox 98% on RA indicating adequate oxygenation    CT scan showed 5 mm stone distal left ureter  Patient had same size stone in that location 1 month ago  This likely represents a stone that has not passed  Will contact urology for admission and probably intervention          Amount and/or Complexity of Data Reviewed  Clinical lab tests: ordered and reviewed  Tests in the radiology section of CPT®: ordered  Decide to obtain previous medical records or to obtain history from someone other than the patient: yes  Review and summarize past medical records: yes  Discuss the patient with other providers: yes    Patient Progress  Patient progress: stable    CritCare Time    Disposition  Final diagnoses:   None     ED Disposition     None      Follow-up Information    None       Patient's Medications   Discharge Prescriptions    No medications on file     No discharge procedures on file      ED Provider  Electronically Signed by           Berto Parsons DO  04/06/18 5102

## 2018-04-07 NOTE — NURSING NOTE
Patient discharged via walking accompanied by RN to elevator  IV D/C and intact  Discharge instructions given  RX and instructions given  Smoking cessation education given  No further questions at this time  Patient did not meet criteria for pneumonia vaccine

## 2018-04-07 NOTE — CONSULTS
Consultation - [facility]  Guillaume Palacios 44 y o  male MRN: [id number]  Unit/Bed#: [address] Encounter: [id number]    Requesting Physician: Maribell Deluca MD    Assessment/Plan:    Left 5 mm distal ureteral stone  Mild to moderate hydronephrosis  Acute kidney injury, resolved overnight with a hydration  Persistent renal colic, unable to be discharged from hospital  · Left ureteroscopic stone extraction  · Possible holmium laser lithotripsy  · Stent placement with string for self removal of stent in four days  · Right renal stone is small enough to leave for now  · Patient should avoid oxalate going forward and drink plenty of fluids daily avoiding dehydration  · He can be discharged home later today on cefadroxil 500 mg one every 12 hours for three doses postoperatively and three more doses to begin again the morning of the stent removal and continued for 12 hours until gone  · He will need follow-up in about six months to see about the status of the right kidney stone, whether it had enlarged or not  HISTORY OF PRESENT ILLNESS:    Guillaume Palacios is a 44y o  year old male who we are asked to see for flank pain and elevated creatinine  He had a 5 mm stone in the left distal ureter on 3/5/2018  He was discharged from the emergency room and referred to my office  He was uninsured and unwilling to pay out-of-pocket and attempted to pass the stone over the last month on his own  He has been unsuccessful and developed worsening pain yesterday which forced him to the emergency department  A repeat CT scan was obtained  A KUB was never obtained at the first visit  Nevertheless the repeat CT did show persistence of the stone in the same area with mild hydroureteronephrosis down to the level of the stone  He is not actively vomiting  His pain is relatively mild at present but not completely gone  Creatinine overnight improved  He has no fevers or chills  Urine is clean      PAST UROLOGIC HISTORY:  None except as noted above  PAST MEDICAL HISTORY:  Past Medical History:   Diagnosis Date    Cornea disorder     Kidney stone        PAST SURGICAL HISTORY:  History reviewed  No pertinent surgical history  ALLERGIES:  No Known Allergies    SOCIAL HISTORY:  History   Alcohol Use No     History   Drug Use No     History   Smoking Status    Current Every Day Smoker    Packs/day: 0 50   Smokeless Tobacco    Never Used       FAMILY HISTORY:  History reviewed  No pertinent family history  MEDICATIONS:    Current Facility-Administered Medications:     docusate sodium (COLACE) capsule 100 mg, 100 mg, Oral, BID, Osama T Stephane, DO, 100 mg at 04/07/18 0805    ketorolac (TORADOL) injection 30 mg, 30 mg, Intravenous, Q6H PRN, Osama T Stephane, DO, 30 mg at 04/07/18 0600    morphine injection 2 mg, 2 mg, Intravenous, Q4H PRN, Osama T Stephane, DO    ondansetron (ZOFRAN) injection 4 mg, 4 mg, Intravenous, Q6H PRN, Osama T Stephane, DO    polyethylene glycol (MIRALAX) packet 17 g, 17 g, Oral, Daily PRN, Osama T Stephane, DO    senna (SENOKOT) tablet 17 2 mg, 2 tablet, Oral, HS, Osama T Stephane, DO, 17 2 mg at 04/07/18 0105    sodium chloride 0 9 % infusion, 150 mL/hr, Intravenous, Continuous, Osama T Stephane, DO, Last Rate: 150 mL/hr at 04/07/18 0105, 150 mL/hr at 04/07/18 0105    tamsulosin (FLOMAX) capsule 0 4 mg, 0 4 mg, Oral, Daily With Dinner, Osama T Stephane, DO    REVIEW OF SYMPTOMS:  Review of Systems   Constitutional: Negative for chills and fever  HENT: Negative for sneezing and sore throat  Respiratory: Negative for cough, chest tightness and wheezing  Cardiovascular: Negative for chest pain and leg swelling  Gastrointestinal: Positive for abdominal pain  Negative for abdominal distention, constipation and nausea  Endocrine: Negative for polyuria  Genitourinary: Positive for flank pain and frequency  Negative for hematuria, testicular pain and urgency     Musculoskeletal: Positive for back pain  Negative for neck pain  Neurological: Negative for light-headedness and headaches  PHYSICAL EXAM:  Vitals:    04/07/18 0740   BP: 125/66   Pulse: 65   Resp: 19   Temp: 98 2 °F (36 8 °C)   SpO2: 98%     Body mass index is 39 05 kg/m²  Physical Exam   Constitutional: He is oriented to person, place, and time  He appears well-developed and well-nourished  HENT:   Head: Normocephalic and atraumatic  Eyes: Left eye exhibits no discharge  No scleral icterus  Neck: No JVD present  No tracheal deviation present  Cardiovascular: Normal rate  Pulmonary/Chest: Effort normal  No stridor  Abdominal: Soft  He exhibits no distension  There is no guarding  Musculoskeletal: He exhibits no edema  Neurological: He is alert and oriented to person, place, and time  Skin: Skin is warm and dry  Psychiatric: He has a normal mood and affect   His behavior is normal  Judgment and thought content normal        Intake/Output Summary (Last 24 hours) at 04/07/18 0854  Last data filed at 04/06/18 2324   Gross per 24 hour   Intake             1000 ml   Output                0 ml   Net             1000 ml       LAB RESULTS:  Lab Results   Component Value Date    WBC 6 50 04/07/2018    HGB 13 6 (L) 04/07/2018    HCT 40 9 (L) 04/07/2018    MCV 89 04/07/2018     04/07/2018     Lab Results   Component Value Date    GLUCOSE 96 04/07/2018    CALCIUM 8 5 04/07/2018     04/07/2018    K 3 9 04/07/2018    CO2 25 04/07/2018     04/07/2018    BUN 15 04/07/2018    CREATININE 1 11 04/07/2018     Lab Results   Component Value Date    CALCIUM 8 5 04/07/2018    PHOS 3 6 04/07/2018       OTHER STUDIES:  CT ABDOMEN AND PELVIS WITHOUT IV CONTRAST - LOW DOSE RENAL STONE      INDICATION:   Flank pain, stone disease suspected      COMPARISON:  3/5/2018      TECHNIQUE:  Low dose thin section CT examination of the abdomen and pelvis was performed without intravenous or oral contrast according to a protocol specifically designed to evaluate for urinary tract calculus  Axial, sagittal, and coronal 2D   reformatted images were created from the source data and submitted for interpretation  Evaluation for pathology in the abdomen and pelvis that is unrelated to urinary tract calculi is limited       Radiation dose length product (DLP) for this visit:  750 76 mGy-cm   This examination, like all CT scans performed in the [facility], was performed utilizing techniques to minimize radiation dose exposure, including the use of iterative   reconstruction and automated exposure control       FINDINGS:     RIGHT KIDNEY AND URETER:  Nonobstructing 2 mm calculus in a right renal calyx  No hydronephrosis or hydroureter      LEFT KIDNEY AND URETER:  Mild left hydronephrosis and hydroureter  Obstructing 4 x 5 5 x 5 mm calculus in the distal left ureter at the ureterovesical junction      URINARY BLADDER:   Partially collapsed, limiting evaluation  No prostate enlargement      No significant abnormality in the visualized lung bases      Limited low radiation dose noncontrast CT evaluation demonstrates no clinically significant abnormality of liver, spleen, pancreas, or adrenal glands  No calcified gallstones or gallbladder wall thickening noted  No ascites or bulky lymphadenopathy on this limited noncontrast study  No bowel obstruction, colitis or diverticulitis  Normal appendix  No acute fracture or destructive osseous lesion is identified         IMPRESSION:        1  Mild left hydronephrosis and hydroureter  Obstructing 5 5 mm calculus in the distal left ureter at the ureterovesical junction  2   Nonobstructing 2 mm right renal calculus       Samuel Mcdowell MD  04/07/18    Portions of the record may have been created with voice recognition software   Occasional wrong word or "sound alike" substitutions may have occurred due to the inherent limitations of voice recognition software   Read the chart carefully and recognize, using context, where substitutions have occurred

## 2018-04-07 NOTE — H&P
History and Physical - [facility]    Patient Information: [place] Ngo 44 y o  male MRN: [id number]  Unit/Bed#: [address] [facility] Encounter: [id number]  Admitting Physician: Naomy Grayson DO  PCP: No primary care provider on file  Date of Admission:  04/07/18    Assessment/Plan:  44year old man with a history of nephrolithiasis presents with a 5 5mm left side UVJ stone with mild hydronephrosis  1) Nephrolithiasis  - IVF NS at 150cc/hr  - PO tamsulosin   - Ketrolac IV for pain control and morphine PRN for breakthrough  - IV ondansetron for nausea/vomiting  - Urology consult for stone retrieval versus ESWL  - AM labs  - No signs of infection at this point will hold off antibiotics  - Keep NPO for now    2) Constipation  - Senna/docusate and PRN miralax    3) Hydronephrosis  - Mild based on CT  - IVF fluids  - Follow renal function    VTE Prophylaxis: SCD  / sequential compression device   Code Status: Full  POLST: There is no POLST form on file for this patient (pre-hospital)    Anticipated Length of Stay:  Patient will be admitted on an Inpatient basis with an anticipated length of stay of  ~ 2 midnights  Justification for Hospital Stay: nephrolithiasis    Total Time for Visit, including Counseling / Coordination of Care: 30 minutes  Greater than 50% of this total time spent on direct patient counseling and coordination of care  Chief Complaint:   Flank pain, hematuria    History of Present Illness:    [place] Ngo is a 44 y o  male with a history of nephrolithiasis who presents with a progressive 1 month history of flank pain, hematuria and decreased urine output  Patient was here 1 month ago and found to have a kidney stone, treated conservatively and discharged  Since then he did well but then his pain recurred, had more hematuria, nausea and vomiting  His oral intake decreased to the point where he hasn't eaten in a week  He presents for evaluation   He endorses left sided flank pain that is severe 10/10 at its peak and radiates to his groin  Denies fevers, chills, rigors or other symptoms  Denies taking any other medications aside from what was prescribed for him from his discharge  He states he hasn't had a proper bowel movement in a few days and feels constipated  Review of Systems:    Review of Systems   Constitutional: Negative for fever  HENT: Negative  Eyes: Negative  Respiratory: Negative  Cardiovascular: Negative  Gastrointestinal: Positive for abdominal pain and constipation  Genitourinary: Positive for decreased urine volume, difficulty urinating, dysuria, flank pain and hematuria  Skin: Negative  Allergic/Immunologic: Negative  Neurological: Negative  Hematological: Negative  Psychiatric/Behavioral: Negative  Past Medical and Surgical History:     Past Medical History:   Diagnosis Date    Cornea disorder     Kidney stone        History reviewed  No pertinent surgical history  Meds/Allergies:    Prior to Admission medications    Medication Sig Start Date End Date Taking? Authorizing Provider   ketorolac (TORADOL) 10 mg tablet Take 1 tablet (10 mg total) by mouth every 6 (six) hours as needed for moderate pain for up to 5 days 3/6/18 3/11/18  Miguel Zapata DO     I have reviewed home medications with patient personally      Allergies: No Known Allergies    Social History:     Marital Status: Single   Occupation:   Patient Pre-hospital Living Situation: Home  Patient Pre-hospital Level of Mobility: Mobile  Patient Pre-hospital Diet Restrictions: None  Substance Use History:   History   Alcohol Use No     History   Smoking Status    Current Every Day Smoker    Packs/day: 0 50   Smokeless Tobacco    Never Used     History   Drug Use No       Family History:    non-contributory    Physical Exam:     Vitals:   Blood Pressure: (!) 171/82 (04/06/18 2153)  Pulse: 89 (04/06/18 2153)  Temperature: 98 5 °F (36 9 °C) (04/06/18 2153)  Temp Source: Oral (04/06/18 2153)  Respirations: 18 (04/06/18 2153)  Height: 5' 11" (180 3 cm) (04/06/18 2153)  Weight - Scale: 127 kg (280 lb) (04/06/18 2153)  SpO2: 98 % (04/06/18 2153)    Physical Exam  Gen: NAD, appears comfortable  HENT: NCAT, PERRL, midline nasal septum, clear oropharynx  Neck: Supple, estimated JVP 7cm H20  CV: RRR with normal S1, S2, no murmurs, rubs, gallops or clicks, DP/PT 2+ equal and symmetric  Lungs: CTA bilaterally  Abd: Soft, mild left sided flank tenderness  Ext: No cyanosis, clubbing or edema  Skin: No rashes  Neuro: A&Ox3  Psych: Normal behavior     Additional Data:     Lab Results: I have personally reviewed pertinent reports  Results from last 7 days  Lab Units 04/06/18  2214   WBC Thousand/uL 9 30   HEMOGLOBIN g/dL 14 4   HEMATOCRIT % 42 9   PLATELETS Thousands/uL 264   NEUTROS PCT % 71   LYMPHS PCT % 21   MONOS PCT % 6   EOS PCT % 2       Results from last 7 days  Lab Units 04/06/18  2214   SODIUM mmol/L 140   POTASSIUM mmol/L 3 8   CHLORIDE mmol/L 104   CO2 mmol/L 26   BUN mg/dL 18   CREATININE mg/dL 1 31*   CALCIUM mg/dL 9 1   GLUCOSE RANDOM mg/dL 113           Imaging: I have personally reviewed pertinent reports  Ct Renal Stone Study Abdomen Pelvis Without Contrast    Result Date: 4/6/2018  Narrative: CT ABDOMEN AND PELVIS WITHOUT IV CONTRAST - LOW DOSE RENAL STONE INDICATION:   Flank pain, stone disease suspected  COMPARISON:  3/5/2018  TECHNIQUE:  Low dose thin section CT examination of the abdomen and pelvis was performed without intravenous or oral contrast according to a protocol specifically designed to evaluate for urinary tract calculus  Axial, sagittal, and coronal 2D reformatted images were created from the source data and submitted for interpretation  Evaluation for pathology in the abdomen and pelvis that is unrelated to urinary tract calculi is limited  Radiation dose length product (DLP) for this visit:  750 76 mGy-cm     This examination, like all CT scans performed in the [facility], was performed utilizing techniques to minimize radiation dose exposure, including the use of iterative  reconstruction and automated exposure control  FINDINGS: RIGHT KIDNEY AND URETER: Nonobstructing 2 mm calculus in a right renal calyx  No hydronephrosis or hydroureter  LEFT KIDNEY AND URETER: Mild left hydronephrosis and hydroureter  Obstructing 4 x 5 5 x 5 mm calculus in the distal left ureter at the ureterovesical junction  URINARY BLADDER: Partially collapsed, limiting evaluation  No prostate enlargement  No significant abnormality in the visualized lung bases  Limited low radiation dose noncontrast CT evaluation demonstrates no clinically significant abnormality of liver, spleen, pancreas, or adrenal glands  No calcified gallstones or gallbladder wall thickening noted  No ascites or bulky lymphadenopathy on this limited noncontrast study  No bowel obstruction, colitis or diverticulitis  Normal appendix  No acute fracture or destructive osseous lesion is identified  Impression: 1  Mild left hydronephrosis and hydroureter  Obstructing 5 5 mm calculus in the distal left ureter at the ureterovesical junction  2   Nonobstructing 2 mm right renal calculus  Workstation performed: [id number]       EKG, Pathology, and Other Studies Reviewed on Admission:   · EKG: None    Allscripts / Epic Records Reviewed: Yes     ** Please Note: This note has been constructed using a voice recognition system   **

## 2018-04-07 NOTE — DISCHARGE INSTRUCTIONS
Antibiotic Instructions:  Post-op period: Take first dose tonight  Take second and third dose tomorrow  (take one basically every 12 hours for a total of 3 doses)    For day of stent removal:  Restart morning of stent removal and take until finished (every 12 hours)  3 doses    Call if you experience fevers, chills, severe pain in the side, or significant bleeding  Remove plastic wrap from penis in 4 days and pull stent out quickly  May take a pain pill prior  Be sure to start antibiotics at least 4 hours before stent removal and continue for 2 more doses

## 2018-04-07 NOTE — DISCHARGE SUMMARY
Discharge- Lulu Carlos 1980, 44 y o  male MRN: [id number]    Unit/Bed#: [address] [place] [address] Encounter: [id number]    Primary Care Provider: No primary care provider on file  Date and time admitted to hospital: 4/6/2018  9:49 PM        * Kidney stone   Assessment & Plan    Patient underwent cystoscopy with retrograde pyelography with stone basketing and placement of left ureteral stent  UA does not show any evidence of UTI   Creatinine has improved  Patient will given cefadroxil 500 milligram p  O  b i d  for 3 days  Follow-up with Dr Shanna Faria for stent removal in 5 days            Left ureteral stone   Assessment & Plan    Status post own basketing and placement of left ureteral stent        Flank pain   Assessment & Plan    Secondary to kidney stone  Will discharge on Percocet p r n  Constipation   Assessment & Plan    Stool softener while on narcotic                Resolved Problems  Date Reviewed: 4/7/2018    None          Consultations During Hospital Stay:  · Dr Nick Georges    Procedures Performed:     · Cystoscopy with left retrograde pyelography, stone basketing and placement of left ureteral stent       Outpatient Tests Requested:  · Follow-up with Dr Stanley in 5 days for stent removal    Complications:  None    Reason for Admission:  Left flank pain    Hospital Course:     Lulu Carlos is a 44 y o  male patient who originally presented to the hospital on 4/6/2018 due to 1 month history of left flank pain with hematuria and decreased urine output  In the ED patient was noted to have a 5 5 millimeter left ureteral stone with mild left hydronephrosis  Patient also reported some constipation  Patient was admitted hospital for left renal colic  Patient was seen in consultation with Urology  Patient underwent cystoscopy, left retrograde pyelogram with stone basketing and placement of left ureteral stent  Patient's UA was negative and patient was not started on antibiotics    Patient will be discharged home on cefadroxil for 6 doses to follow up with Urology as outpatient for stent removal    Please see above list of diagnoses and related plan for additional information  Condition at Discharge: stable     Discharge Day Visit / Exam:     Subjective:  Patient does complain of mild left flank pain    Vitals: Blood Pressure: 111/67 (04/07/18 1137)  Pulse: 69 (04/07/18 1137)  Temperature: (!) 97 °F (36 1 °C) (04/07/18 1125)  Temp Source: Oral (04/07/18 0740)  Respirations: 20 (04/07/18 1137)  Height: 5' 11" (180 3 cm) (04/07/18 0054)  Weight - Scale: 127 kg (280 lb) (04/06/18 2153)  SpO2: 98 % (04/07/18 1137)  Exam:   Physical Exam   Constitutional: No distress  HENT:   Head: Normocephalic and atraumatic  Nose: Nose normal    Mouth/Throat: Oropharynx is clear and moist    Eyes: Conjunctivae and EOM are normal  Pupils are equal, round, and reactive to light  Neck: Normal range of motion  Neck supple  No JVD present  Cardiovascular: Normal rate, regular rhythm and normal heart sounds  Exam reveals no gallop and no friction rub  No murmur heard  Pulmonary/Chest: Effort normal and breath sounds normal  No respiratory distress  He has no wheezes  He has no rales  He exhibits no tenderness  Abdominal: Soft  Bowel sounds are normal  He exhibits no distension  There is no tenderness  There is no rebound and no guarding  Musculoskeletal: He exhibits no edema  Neurological: He is alert  No cranial nerve deficit  Skin: Skin is warm and dry  No rash noted  Psychiatric: He has a normal mood and affect  Discussion with Family: Wife    Discharge instructions/Information to patient and family:   See after visit summary for information provided to patient and family  Provisions for Follow-Up Care:  See after visit summary for information related to follow-up care and any pertinent home health orders        Disposition:     Home    For Discharges to [facility]F:   · Not Applicable to this Patient - Not Applicable to this Patient    Planned Readmission: No     Discharge Statement:  I spent 25 minutes discharging the patient  This time was spent on the day of discharge  I had direct contact with the patient on the day of discharge  Greater than 50% of the total time was spent examining patient, answering all patient questions, arranging and discussing plan of care with patient as well as directly providing post-discharge instructions  Additional time then spent on discharge activities  Discharge Medications:  See after visit summary for reconciled discharge medications provided to patient and family        ** Please Note: This note has been constructed using a voice recognition system **

## 2018-04-07 NOTE — OP NOTE
OPERATIVE REPORT  PATIENT NAME: Olena Duckworth    :  1980  MRN: [id number]  Pt Location: [facility] OR ROOM 04    SURGERY DATE: 2018    Surgeon(s) and Role:     * Shakeel Torres MD - Primary    Preop Diagnosis:  Left ureteral stone [N20 1]    Post-Op Diagnosis Codes:     * Left ureteral stone [N20 1]    Procedure(s) (LRB):  CYSTOSCOPY URETEROSCOPY,  RETROGRADE PYELOGRAM, STONE BASKET EXTRACTION  AND INSERTION URETERAL STENT (Left)    Specimen(s):  ID Type Source Tests Collected by Time Destination   1 : LEFT URETERAL STONE Calculus Ureter, Left STONE ANALYSIS, TISSUE EXAM Shakeel Torres MD 2018 [id number]        Estimated Blood Loss:   Minimal    Drains:       Anesthesia Type:   IV SED/ON-Q    Operative Indications:  Left ureteral stone [N20 1]  Left Hydro    Operative Findings:  Left ureteral stone    Complications:   None    Procedure and Technique:  After obtaining consent and indentifying the patient, antibiotics were given as ordered and the patient was brought to the room  All appropriate leads and monitors were placed and the patient was appropriately positioned on the table  Anesthesia was administered and the patient was sterilely prepped and draped  A timeout was performed where the patient name, , procedure, antibiotics, allergies, etc  were discussed  All in the room were satisfied before the start of the operative procedure  What follows are the operative findings and events  Cystoscopy was performed   imaging was obtained  The stone was located distally and was opaque on x-ray  A retrograde pyelogram was performed  A guidewire was passed up the ureter to the kidney and the scope was removed  The wire was secured to the drape as a safety wire  The rigid ureteroscope was passed up the ureter to the stone over a second wire  The stone was identified and a picture was taken  The stone was extracted with a stone basket  A retrograde was performed   The scope was withdrawn down the ureter evaluating for any trauma  There were no stone fragments or trauma noted  The safety wire was backloaded through the cystoscope and the stent was placed over the wire  The wire was removed leaving a good coil proximally in the kidney and distally in the bladder  The string at the end of the wire was secured to the penis with a tegaderm  The bladder was drained and the patient was awakened and  transferred to the PACU in satisfactory condition  Patient Disposition: To [facility]  Will remove stent on own or come to the office in 4-5 days  Home on cefadroxil # 6 pills  Pain meds      SIGNATURE: Haydee Lopez MD  DATE: April 7, 2018  TIME: 11:16 AM

## 2018-04-07 NOTE — SOCIAL WORK
DASH discussion completed  Discussed goals of making sure pt's  needs are met upon discharge, pt's preferences are taken into account, pt understands health condition, medications and symptoms to watch for after returning home and pt is aware of any follow up appointments recommended by hospital physician  PT LIVES INDEPENDENTLY, NO DME OR HHC NEEDS, DCP IS TO HOME WHEN STABLE

## 2018-04-07 NOTE — CASE MANAGEMENT
Initial Clinical Review    Admission: Date/Time/Statement: 4/6/18 @ 2337     Orders Placed This Encounter   Procedures    Inpatient Admission (expected length of stay for this patient is greater than two midnights)     Standing Status:   Standing     Number of Occurrences:   1     Order Specific Question:   Admitting Physician     Answer:   Jessie Dimas [[id number]]     Order Specific Question:   Level of Care     Answer:   Med Surg [16]     Order Specific Question:   Estimated length of stay     Answer:   More than 2 Midnights     Order Specific Question:   Certification     Answer:   I certify that inpatient services are medically necessary for this patient for a duration of greater than two midnights  See H&P and MD Progress Notes for additional information about the patient's course of treatment  ED: Date/Time/Mode of Arrival:   ED Arrival Information     Expected Arrival Acuity Means of Arrival Escorted By Service Admission Type    - 4/6/2018 21:44 Urgent Walk-In Family Member General Medicine Urgent    Arrival Complaint    back pain          Chief Complaint:   Chief Complaint   Patient presents with    Flank Pain     patient c/o left side flank pain starting sunday, ran out of pain medications today, unable to follow up with doctor due to no insurance        History of Illness: Truman Calderon is a 44 y o  male with a history of nephrolithiasis who presents with a progressive 1 month history of flank pain, hematuria and decreased urine output  Patient was here 1 month ago and found to have a kidney stone, treated conservatively and discharged  Since then he did well but then his pain recurred, had more hematuria, nausea and vomiting  His oral intake decreased to the point where he hasn't eaten in a week  He presents for evaluation  He endorses left sided flank pain that is severe 10/10 at its peak and radiates to his groin  Denies fevers, chills, rigors or other symptoms   Denies taking any other medications aside from what was prescribed for him from his discharge  He states he hasn't had a proper bowel movement in a few days and feels constipated  ED Vital Signs:   ED Triage Vitals   Temperature Pulse Respirations Blood Pressure SpO2   04/06/18 2153 04/06/18 2153 04/06/18 2153 04/06/18 2153 04/06/18 2153   98 5 °F (36 9 °C) 89 18 (!) 171/82 98 %      Temp Source Heart Rate Source Patient Position - Orthostatic VS BP Location FiO2 (%)   04/06/18 2153 04/06/18 2153 04/07/18 0740 04/06/18 2153 --   Oral Monitor Lying Right arm       Pain Score       04/06/18 2153       8        Wt Readings from Last 1 Encounters:   04/06/18 127 kg (280 lb)       Vital Signs (abnormal): Abnormal Labs/Diagnostic Test Results:   CREATININE mg/dL 1 31*   CT SCAN RENAL Mild left hydronephrosis and hydroureter  Obstructing 5 5 mm calculus in the distal left ureter at the ureterovesical junction  2   Nonobstructing 2 mm right renal calculus  ED Treatment:   Medication Administration from 04/06/2018 2143 to 04/07/2018 0014       Date/Time Order Dose Route Action Action by Comments     04/06/2018 2324 sodium chloride 0 9 % bolus 1,000 mL 0 mL Intravenous Stopped Zeke Setvenson RN      04/06/2018 2218 sodium chloride 0 9 % bolus 1,000 mL 1,000 mL Intravenous Williamtakilet 37 Eugene Mo RN      04/06/2018 2222 ketorolac (TORADOL) injection 15 mg 15 mg Intravenous Given Eugene Mo RN      04/06/2018 2220 ondansetron (ZOFRAN) injection 4 mg 4 mg Intravenous Given Eugene Mo RN      04/06/2018 2220 morphine (PF) 4 mg/mL injection 4 mg 4 mg Intravenous Given Eugene Mo RN      04/06/2018 2349 tamsulosin (FLOMAX) capsule 0 4 mg 0 4 mg Oral Given Zeke Stevenson RN      04/06/2018 2350 sodium chloride 0 9 % infusion 125 mL/hr Intravenous New Bag Zeke Stevenson RN           Past Medical/Surgical History:    Active Ambulatory Problems     Diagnosis Date Noted    No Active Ambulatory Problems     Resolved Ambulatory Problems     Diagnosis Date Noted    No Resolved Ambulatory Problems     Past Medical History:   Diagnosis Date    Cornea disorder     Kidney stone        Admitting Diagnosis: Kidney stone [N20 0]  Flank pain [R10 9]    Age/Sex: 44 y o  male    Assessment/Plan:   Assessment/Plan:  44year old man with a history of nephrolithiasis presents with a 5 5mm left side UVJ stone with mild hydronephrosis     1) Nephrolithiasis  - IVF NS at 150cc/hr  - PO tamsulosin   - Ketrolac IV for pain control and morphine PRN for breakthrough  - IV ondansetron for nausea/vomiting  - Urology consult for stone retrieval versus ESWL  - AM labs  - No signs of infection at this point will hold off antibiotics  - Keep NPO for now     2) Constipation  - Senna/docusate and PRN miralax     3) Hydronephrosis  - Mild based on CT  - IVF fluids  - Follow renal function     VTE Prophylaxis: SCD  / sequential compression device   Code Status: Full  POLST: There is no POLST form on file for this patient (pre-hospital)     Anticipated Length of Stay:  Patient will be admitted on an Inpatient basis with an anticipated length of stay of  ~ 2 midnights     Justification for Hospital Stay: nephrolithiasis     Admission Orders:  [facility]  CONSULT UROLOGY    Scheduled Meds:   Current Facility-Administered Medications:  docusate sodium 100 mg Oral BID Osama T Stephane, DO    ketorolac 30 mg Intravenous Q6H PRN Osama T Stephane, DO    morphine injection 2 mg Intravenous Q4H PRN Osama T Stephane, DO    ondansetron 4 mg Intravenous Q6H PRN Osama T Stephane, DO    polyethylene glycol 17 g Oral Daily PRN Osama T Stephane, DO    senna 2 tablet Oral HS Osama T Stephane, DO    sodium chloride 150 mL/hr Intravenous Continuous Cleavon Stephanie, DO Last Rate: 150 mL/hr (04/07/18 0105)   tamsulosin 0 4 mg Oral Daily With Dinner Cleavon Stephanie, DO      Continuous Infusions:   sodium chloride 150 mL/hr Last Rate: 150 mL/hr (04/07/18 0105)     PRN Meds: ketorolac    morphine injection   ondansetron    polyethylene glycol

## 2018-04-07 NOTE — ASSESSMENT & PLAN NOTE
Patient underwent cystoscopy with retrograde pyelography with stone basketing and placement of left ureteral stent  UA does not show any evidence of UTI   Creatinine has improved  Patient will given cefadroxil 500 milligram p    O  b i d  for 3 days  Follow-up with Dr Mary Bello for stent removal in 5 days

## 2018-04-07 NOTE — PLAN OF CARE
DISCHARGE PLANNING     Discharge to home or other facility with appropriate resources Progressing        INFECTION - ADULT     Absence or prevention of progression during hospitalization Progressing     Absence of fever/infection during neutropenic period Progressing        Knowledge Deficit     Patient/family/caregiver demonstrates understanding of disease process, treatment plan, medications, and discharge instructions Progressing        PAIN - ADULT     Verbalizes/displays adequate comfort level or baseline comfort level Progressing        SAFETY ADULT     Patient will remain free of falls Progressing     Maintain or return to baseline ADL function Progressing     Maintain or return mobility status to optimal level Progressing        Pt's plan of care initiated

## 2018-04-08 LAB — MRSA NOSE QL CULT: NORMAL

## 2018-04-08 NOTE — PHYSICIAN ADVISOR
Current patient class: Inpatient  The patient is currently on Hospital Day: 2      The patient was admitted to the hospital  on 4/6/18 at 2337 for the following diagnosis:  Kidney stone [N20 0]  Flank pain [R10 9]     After review of the relevant documentation, labs, vital signs and test results, the patient is a provider liable case and is most appropriate for OBSERVATION STATUS  In this particular case the patient was admitted to the hospital as an inpatient  The patient however fails to satisfy the 2 midnight benchmark and closer scrutiny of the case is warranted  After review of the patient presentation and relevant labs the patient was most appropriate for observation status on admission  Given that this patient has already been discharged prior to this review they become a provider liable case  Rationale is as follows: The patient is a 44 yrs   Male who presented to the ED at 4/6/2018  9:49 PM with a chief complaint of Flank Pain (patient c/o left side flank pain starting sunday, ran out of pain medications today, unable to follow up with doctor due to no insurance ) the patient was admitted to the hospital for 1 midnight for kidney stones  The patient had elevated creatinine at the time of admission, and was appropriate for observation status  The patient remained hospitalized for 1 midnight, did undergo cystoscopy, and stone removal   The patient did not satisfy the 2 midnight benchmark, and was most appropriate for observation at the time of admission  In general nephrolithiasis can be managed under observation order outpatient status, and the patient should have been observation with escalation in care as needed      The patients vitals on arrival were ED Triage Vitals   Temperature Pulse Respirations Blood Pressure SpO2   04/06/18 2153 04/06/18 2153 04/06/18 2153 04/06/18 2153 04/06/18 2153   98 5 °F (36 9 °C) 89 18 (!) 171/82 98 %      Temp Source Heart Rate Source Patient Position - Orthostatic VS BP Location FiO2 (%)   04/06/18 2153 04/06/18 2153 04/07/18 0740 04/06/18 2153 --   Oral Monitor Lying Right arm       Pain Score       04/06/18 2153       8           Past Medical History:   Diagnosis Date    Cornea disorder     Kidney stone      History reviewed  No pertinent surgical history  Consults have been placed to:   IP CONSULT TO UROLOGY    Vitals:    04/07/18 0054 04/07/18 0740 04/07/18 1125 04/07/18 1137   BP: 134/75 125/66 118/51 111/67   BP Location: Right arm Right arm     Pulse: 70 65 67 69   Resp: 20 19 18 20   Temp: 98 6 °F (37 °C) 98 2 °F (36 8 °C) (!) 97 °F (36 1 °C)    TempSrc: Oral Oral     SpO2: 95% 98% 97% 98%   Weight:       Height: 5' 11" (1 803 m)          Most recent labs:    Recent Labs      04/07/18   0555   WBC  6 50   HGB  13 6*   HCT  40 9*   PLT  242   K  3 9   NA  138   CALCIUM  8 5   BUN  15   CREATININE  1 11       Scheduled Meds:  Continuous Infusions:  No current facility-administered medications for this encounter  PRN Meds:      Surgical procedures (if appropriate):  Procedure(s):  CYSTOSCOPY URETEROSCOPY,  RETROGRADE PYELOGRAM, STONE BASKET EXTRACTION  AND INSERTION URETERAL STENT

## 2018-04-18 LAB
CA PHOS MFR STONE: 3 %
CALCIUM OXALATE DIHYDRATE MFR STONE IR: 2 %
COLOR STONE: NORMAL
COM MFR STONE: 95 %
COMMENT-STONE3: NORMAL
COMPOSITION: NORMAL
LABORATORY COMMENT REPORT: NORMAL
NIDUS STONE QL: NORMAL
PHOTO: NORMAL
SIZE STONE: NORMAL MM
STONE ANALYSIS-IMP: NORMAL
SURFACE CRYSTALS: NORMAL
WT STONE: 46.3 MG

## (undated) DEVICE — CYSTOSCOPY PACK: Brand: CONVERTORS

## (undated) DEVICE — GUIDEWIRE STRGHT TIP 0.038 IN SOLO PLUS

## (undated) DEVICE — RADIOLOGY STERILE LABELS: Brand: CENTURION

## (undated) DEVICE — URETERAL CATH 5 FR

## (undated) DEVICE — GLOVE SRG BIOGEL 7.5

## (undated) DEVICE — LUBRICANT SURGILUBE TUBE 4 OZ  FLIP TOP

## (undated) DEVICE — SEAL BIOPSY PORT ADJUST F/ACCESSORIES UP TO 3FR

## (undated) DEVICE — POUCH UR CATCHER STERILE

## (undated) DEVICE — FABRIC REINFORCED, SURGICAL GOWN, XL: Brand: CONVERTORS

## (undated) DEVICE — CYSTO TUBING TUR Y IRRIGATION

## (undated) DEVICE — Device

## (undated) DEVICE — NGAGE, NITINOL STONE EXTRACTOR: Brand: NGAGE

## (undated) DEVICE — SYRINGE 10ML LL CONTROL TOP